# Patient Record
Sex: MALE | Race: OTHER | Employment: FULL TIME | URBAN - METROPOLITAN AREA
[De-identification: names, ages, dates, MRNs, and addresses within clinical notes are randomized per-mention and may not be internally consistent; named-entity substitution may affect disease eponyms.]

---

## 2021-06-08 ENCOUNTER — HOSPITAL ENCOUNTER (INPATIENT)
Age: 41
LOS: 1 days | Discharge: HOME | DRG: 812 | End: 2021-06-09
Attending: EMERGENCY MEDICINE | Admitting: INTERNAL MEDICINE

## 2021-06-08 ENCOUNTER — APPOINTMENT (OUTPATIENT)
Dept: CT IMAGING | Age: 41
DRG: 812 | End: 2021-06-08

## 2021-06-08 DIAGNOSIS — R10.9 FLANK PAIN: ICD-10-CM

## 2021-06-08 DIAGNOSIS — D50.0 IRON DEFICIENCY ANEMIA DUE TO CHRONIC BLOOD LOSS: ICD-10-CM

## 2021-06-08 DIAGNOSIS — D50.9 MICROCYTIC ANEMIA: Primary | ICD-10-CM

## 2021-06-08 PROBLEM — D64.9 SYMPTOMATIC ANEMIA: Status: ACTIVE | Noted: 2021-06-08

## 2021-06-08 LAB
A/G RATIO: 1.5 (ref 1.1–2.2)
ABO/RH: NORMAL
ALBUMIN SERPL-MCNC: 4.4 G/DL (ref 3.4–5)
ALP BLD-CCNC: 80 U/L (ref 40–129)
ALT SERPL-CCNC: 16 U/L (ref 10–40)
ANION GAP SERPL CALCULATED.3IONS-SCNC: 13 MMOL/L (ref 3–16)
ANTIBODY SCREEN: NORMAL
AST SERPL-CCNC: 26 U/L (ref 15–37)
BASOPHILS ABSOLUTE: 0.1 K/UL (ref 0–0.2)
BASOPHILS RELATIVE PERCENT: 2.4 %
BILIRUB SERPL-MCNC: 1 MG/DL (ref 0–1)
BILIRUBIN URINE: NEGATIVE
BLOOD, URINE: NEGATIVE
BUN BLDV-MCNC: 9 MG/DL (ref 7–20)
CALCIUM SERPL-MCNC: 8.5 MG/DL (ref 8.3–10.6)
CHLORIDE BLD-SCNC: 106 MMOL/L (ref 99–110)
CLARITY: CLEAR
CO2: 24 MMOL/L (ref 21–32)
COLOR: YELLOW
CREAT SERPL-MCNC: 0.8 MG/DL (ref 0.9–1.3)
EOSINOPHILS ABSOLUTE: 0.1 K/UL (ref 0–0.6)
EOSINOPHILS RELATIVE PERCENT: 1.9 %
EPITHELIAL CELLS, UA: 0 /HPF (ref 0–5)
ETHANOL: NORMAL MG/DL (ref 0–0.08)
FERRITIN: 3.3 NG/ML (ref 30–400)
FOLATE: 17.31 NG/ML (ref 4.78–24.2)
GFR AFRICAN AMERICAN: >60
GFR NON-AFRICAN AMERICAN: >60
GLOBULIN: 3 G/DL
GLUCOSE BLD-MCNC: 108 MG/DL (ref 70–99)
GLUCOSE URINE: NEGATIVE MG/DL
HCT VFR BLD CALC: 23.3 % (ref 40.5–52.5)
HCT VFR BLD CALC: 26.4 % (ref 40.5–52.5)
HCT VFR BLD CALC: 27.8 % (ref 40.5–52.5)
HEMATOLOGY PATH CONSULT: NORMAL
HEMOGLOBIN: 6.7 G/DL (ref 13.5–17.5)
HEMOGLOBIN: 7.7 G/DL (ref 13.5–17.5)
HEMOGLOBIN: 8 G/DL (ref 13.5–17.5)
HYALINE CASTS: 6 /LPF (ref 0–8)
INR BLD: 1.13 (ref 0.86–1.14)
IRON SATURATION: 3 % (ref 20–50)
IRON: 12 UG/DL (ref 59–158)
KETONES, URINE: NEGATIVE MG/DL
LEUKOCYTE ESTERASE, URINE: NEGATIVE
LIPASE: 24 U/L (ref 13–60)
LYMPHOCYTES ABSOLUTE: 1 K/UL (ref 1–5.1)
LYMPHOCYTES RELATIVE PERCENT: 18.8 %
MAGNESIUM: 2.3 MG/DL (ref 1.8–2.4)
MCH RBC QN AUTO: 16.7 PG (ref 26–34)
MCHC RBC AUTO-ENTMCNC: 28.8 G/DL (ref 31–36)
MCV RBC AUTO: 58.1 FL (ref 80–100)
MICROSCOPIC EXAMINATION: YES
MONOCYTES ABSOLUTE: 0.2 K/UL (ref 0–1.3)
MONOCYTES RELATIVE PERCENT: 4.2 %
NEUTROPHILS ABSOLUTE: 3.8 K/UL (ref 1.7–7.7)
NEUTROPHILS RELATIVE PERCENT: 72.7 %
NITRITE, URINE: NEGATIVE
OCCULT BLOOD DIAGNOSTIC: NORMAL
PDW BLD-RTO: 21.7 % (ref 12.4–15.4)
PH UA: 5.5 (ref 5–8)
PLATELET # BLD: 240 K/UL (ref 135–450)
PMV BLD AUTO: 9.8 FL (ref 5–10.5)
POTASSIUM SERPL-SCNC: 3.8 MMOL/L (ref 3.5–5.1)
PROTEIN UA: 30 MG/DL
PROTHROMBIN TIME: 13.1 SEC (ref 10–13.2)
RBC # BLD: 4.02 M/UL (ref 4.2–5.9)
RBC UA: 1 /HPF (ref 0–4)
SODIUM BLD-SCNC: 143 MMOL/L (ref 136–145)
SPECIFIC GRAVITY UA: >=1.03 (ref 1–1.03)
TOTAL IRON BINDING CAPACITY: 455 UG/DL (ref 260–445)
TOTAL PROTEIN: 7.4 G/DL (ref 6.4–8.2)
URINE REFLEX TO CULTURE: ABNORMAL
URINE TYPE: ABNORMAL
UROBILINOGEN, URINE: 0.2 E.U./DL
VITAMIN B-12: 621 PG/ML (ref 211–911)
WBC # BLD: 5.2 K/UL (ref 4–11)
WBC UA: 1 /HPF (ref 0–5)

## 2021-06-08 PROCEDURE — 83550 IRON BINDING TEST: CPT

## 2021-06-08 PROCEDURE — 86923 COMPATIBILITY TEST ELECTRIC: CPT

## 2021-06-08 PROCEDURE — 82607 VITAMIN B-12: CPT

## 2021-06-08 PROCEDURE — 6370000000 HC RX 637 (ALT 250 FOR IP): Performed by: PHYSICIAN ASSISTANT

## 2021-06-08 PROCEDURE — 80053 COMPREHEN METABOLIC PANEL: CPT

## 2021-06-08 PROCEDURE — 80307 DRUG TEST PRSMV CHEM ANLYZR: CPT

## 2021-06-08 PROCEDURE — G0328 FECAL BLOOD SCRN IMMUNOASSAY: HCPCS

## 2021-06-08 PROCEDURE — 85610 PROTHROMBIN TIME: CPT

## 2021-06-08 PROCEDURE — 96374 THER/PROPH/DIAG INJ IV PUSH: CPT

## 2021-06-08 PROCEDURE — 99285 EMERGENCY DEPT VISIT HI MDM: CPT

## 2021-06-08 PROCEDURE — 82728 ASSAY OF FERRITIN: CPT

## 2021-06-08 PROCEDURE — 6370000000 HC RX 637 (ALT 250 FOR IP): Performed by: INTERNAL MEDICINE

## 2021-06-08 PROCEDURE — 86850 RBC ANTIBODY SCREEN: CPT

## 2021-06-08 PROCEDURE — 85025 COMPLETE CBC W/AUTO DIFF WBC: CPT

## 2021-06-08 PROCEDURE — 83735 ASSAY OF MAGNESIUM: CPT

## 2021-06-08 PROCEDURE — 85014 HEMATOCRIT: CPT

## 2021-06-08 PROCEDURE — 83540 ASSAY OF IRON: CPT

## 2021-06-08 PROCEDURE — P9016 RBC LEUKOCYTES REDUCED: HCPCS

## 2021-06-08 PROCEDURE — 36430 TRANSFUSION BLD/BLD COMPNT: CPT

## 2021-06-08 PROCEDURE — 81001 URINALYSIS AUTO W/SCOPE: CPT

## 2021-06-08 PROCEDURE — 83690 ASSAY OF LIPASE: CPT

## 2021-06-08 PROCEDURE — 86901 BLOOD TYPING SEROLOGIC RH(D): CPT

## 2021-06-08 PROCEDURE — 74174 CTA ABD&PLVS W/CONTRAST: CPT

## 2021-06-08 PROCEDURE — 1200000000 HC SEMI PRIVATE

## 2021-06-08 PROCEDURE — 6360000004 HC RX CONTRAST MEDICATION: Performed by: EMERGENCY MEDICINE

## 2021-06-08 PROCEDURE — 2580000003 HC RX 258: Performed by: INTERNAL MEDICINE

## 2021-06-08 PROCEDURE — 36415 COLL VENOUS BLD VENIPUNCTURE: CPT

## 2021-06-08 PROCEDURE — 85018 HEMOGLOBIN: CPT

## 2021-06-08 PROCEDURE — 6360000002 HC RX W HCPCS

## 2021-06-08 PROCEDURE — 2000000000 HC ICU R&B

## 2021-06-08 PROCEDURE — 86900 BLOOD TYPING SEROLOGIC ABO: CPT

## 2021-06-08 PROCEDURE — 82746 ASSAY OF FOLIC ACID SERUM: CPT

## 2021-06-08 PROCEDURE — 6360000002 HC RX W HCPCS: Performed by: INTERNAL MEDICINE

## 2021-06-08 PROCEDURE — 82077 ASSAY SPEC XCP UR&BREATH IA: CPT

## 2021-06-08 RX ORDER — ACETAMINOPHEN 325 MG/1
650 TABLET ORAL EVERY 6 HOURS PRN
Status: DISCONTINUED | OUTPATIENT
Start: 2021-06-08 | End: 2021-06-09 | Stop reason: HOSPADM

## 2021-06-08 RX ORDER — KETOROLAC TROMETHAMINE 30 MG/ML
INJECTION, SOLUTION INTRAMUSCULAR; INTRAVENOUS
Status: COMPLETED
Start: 2021-06-08 | End: 2021-06-08

## 2021-06-08 RX ORDER — SODIUM CHLORIDE 9 MG/ML
INJECTION, SOLUTION INTRAVENOUS PRN
Status: DISCONTINUED | OUTPATIENT
Start: 2021-06-08 | End: 2021-06-09 | Stop reason: HOSPADM

## 2021-06-08 RX ORDER — ONDANSETRON 2 MG/ML
4 INJECTION INTRAMUSCULAR; INTRAVENOUS EVERY 6 HOURS PRN
Status: DISCONTINUED | OUTPATIENT
Start: 2021-06-08 | End: 2021-06-09 | Stop reason: HOSPADM

## 2021-06-08 RX ORDER — PEG-3350, SODIUM SULFATE, SODIUM CHLORIDE, POTASSIUM CHLORIDE, SODIUM ASCORBATE AND ASCORBIC ACID 7.5-2.691G
100 KIT ORAL ONCE
Status: DISCONTINUED | OUTPATIENT
Start: 2021-06-08 | End: 2021-06-08

## 2021-06-08 RX ORDER — ONDANSETRON 4 MG/1
4 TABLET, ORALLY DISINTEGRATING ORAL EVERY 8 HOURS PRN
Status: DISCONTINUED | OUTPATIENT
Start: 2021-06-08 | End: 2021-06-09 | Stop reason: HOSPADM

## 2021-06-08 RX ORDER — PANTOPRAZOLE SODIUM 40 MG/1
40 TABLET, DELAYED RELEASE ORAL
Status: DISCONTINUED | OUTPATIENT
Start: 2021-06-08 | End: 2021-06-09 | Stop reason: HOSPADM

## 2021-06-08 RX ORDER — SODIUM CHLORIDE 9 MG/ML
INJECTION, SOLUTION INTRAVENOUS CONTINUOUS
Status: DISCONTINUED | OUTPATIENT
Start: 2021-06-08 | End: 2021-06-09 | Stop reason: HOSPADM

## 2021-06-08 RX ORDER — KETOROLAC TROMETHAMINE 30 MG/ML
30 INJECTION, SOLUTION INTRAMUSCULAR; INTRAVENOUS ONCE
Status: COMPLETED | OUTPATIENT
Start: 2021-06-08 | End: 2021-06-08

## 2021-06-08 RX ORDER — SODIUM CHLORIDE 0.9 % (FLUSH) 0.9 %
5-40 SYRINGE (ML) INJECTION EVERY 12 HOURS SCHEDULED
Status: DISCONTINUED | OUTPATIENT
Start: 2021-06-08 | End: 2021-06-09 | Stop reason: HOSPADM

## 2021-06-08 RX ORDER — ACETAMINOPHEN 650 MG/1
650 SUPPOSITORY RECTAL EVERY 6 HOURS PRN
Status: DISCONTINUED | OUTPATIENT
Start: 2021-06-08 | End: 2021-06-09 | Stop reason: HOSPADM

## 2021-06-08 RX ORDER — SODIUM CHLORIDE 9 MG/ML
25 INJECTION, SOLUTION INTRAVENOUS PRN
Status: DISCONTINUED | OUTPATIENT
Start: 2021-06-08 | End: 2021-06-09 | Stop reason: HOSPADM

## 2021-06-08 RX ORDER — SODIUM CHLORIDE 0.9 % (FLUSH) 0.9 %
5-40 SYRINGE (ML) INJECTION PRN
Status: DISCONTINUED | OUTPATIENT
Start: 2021-06-08 | End: 2021-06-09 | Stop reason: HOSPADM

## 2021-06-08 RX ADMIN — SODIUM CHLORIDE: 9 INJECTION, SOLUTION INTRAVENOUS at 11:57

## 2021-06-08 RX ADMIN — PANTOPRAZOLE SODIUM 40 MG: 40 TABLET, DELAYED RELEASE ORAL at 14:05

## 2021-06-08 RX ADMIN — IRON SUCROSE 100 MG: 20 INJECTION, SOLUTION INTRAVENOUS at 16:28

## 2021-06-08 RX ADMIN — KETOROLAC TROMETHAMINE 30 MG: 30 INJECTION, SOLUTION INTRAMUSCULAR at 04:56

## 2021-06-08 RX ADMIN — Medication 10 ML: at 20:51

## 2021-06-08 RX ADMIN — KETOROLAC TROMETHAMINE 30 MG: 30 INJECTION, SOLUTION INTRAMUSCULAR; INTRAVENOUS at 04:56

## 2021-06-08 RX ADMIN — IOPAMIDOL 75 ML: 755 INJECTION, SOLUTION INTRAVENOUS at 05:46

## 2021-06-08 RX ADMIN — SODIUM CHLORIDE: 9 INJECTION, SOLUTION INTRAVENOUS at 23:09

## 2021-06-08 RX ADMIN — Medication 10 ML: at 11:58

## 2021-06-08 RX ADMIN — ACETAMINOPHEN 650 MG: 325 TABLET ORAL at 20:42

## 2021-06-08 ASSESSMENT — PAIN SCALES - GENERAL
PAINLEVEL_OUTOF10: 0
PAINLEVEL_OUTOF10: 10
PAINLEVEL_OUTOF10: 0
PAINLEVEL_OUTOF10: 8
PAINLEVEL_OUTOF10: 0
PAINLEVEL_OUTOF10: 0

## 2021-06-08 ASSESSMENT — PAIN DESCRIPTION - ONSET: ONSET: GRADUAL

## 2021-06-08 ASSESSMENT — ENCOUNTER SYMPTOMS
ABDOMINAL PAIN: 1
EYE PAIN: 0
SINUS PAIN: 0
SHORTNESS OF BREATH: 0
BACK PAIN: 1

## 2021-06-08 ASSESSMENT — PAIN DESCRIPTION - DIRECTION: RADIATING_TOWARDS: BUTTOCKS

## 2021-06-08 ASSESSMENT — PAIN DESCRIPTION - PAIN TYPE: TYPE: ACUTE PAIN

## 2021-06-08 ASSESSMENT — PAIN DESCRIPTION - ORIENTATION: ORIENTATION: LOWER

## 2021-06-08 ASSESSMENT — PAIN DESCRIPTION - FREQUENCY: FREQUENCY: CONTINUOUS

## 2021-06-08 ASSESSMENT — PAIN DESCRIPTION - DESCRIPTORS: DESCRIPTORS: ACHING

## 2021-06-08 ASSESSMENT — PAIN DESCRIPTION - LOCATION: LOCATION: BACK;BUTTOCKS

## 2021-06-08 NOTE — ED NOTES
Called lab to release blood, state there are two units ready however no transfusion release.       Gaston Moy RN  75/60/38 6921

## 2021-06-08 NOTE — CONSULTS
frequency   Integument/breast: negative for pruritus and rash   Hematologic/lymphatic: negative for bleeding and easy bruising   Musculoskeletal:negative for arthralgias and myalgias   Neurological: negative for dizziness and weakness           Physical Exam  Blood pressure 129/72, pulse 74, temperature 97.9 °F (36.6 °C), temperature source Temporal, resp. rate 16, height (!) 6.4\" (0.163 m), weight 150 lb (68 kg), SpO2 97 %. General appearance: alert, cooperative, no distress, appears stated age  Eyes: Anicteric  Head: Normocephalic, without obvious abnormality  Lungs: clear to auscultation bilaterally, Normal Effort  Heart: regular rate and rhythm, normal S1 and S2, no murmurs or rubs  Abdomen: soft, non-tender. Bowel sounds normal. No masses,  no organomegaly. Extremities: atraumatic, no cyanosis or edema  Skin: warm and dry, no jaundice  Neuro: Grossly intact, A&OX3  Musculoskeletal: 5/5  strength BUE      Data Review:    Recent Labs     06/08/21 0458   WBC 5.2   HGB 6.7*   HCT 23.3*   MCV 58.1*        Recent Labs     06/08/21 0458      K 3.8      CO2 24   BUN 9   CREATININE 0.8*     Recent Labs     06/08/21 0458   AST 26   ALT 16   BILITOT 1.0   ALKPHOS 80     Recent Labs     06/08/21 0458   LIPASE 24.0     No results for input(s): PROTIME, INR in the last 72 hours. No results for input(s): PTT in the last 72 hours. No results for input(s): OCCULTBLD in the last 72 hours.     Imaging Studies:                               CTA scan of chest abdomen and pelvis   Impression   No renal calculi, ureteral calculi, or findings to suggest a recently passed   stone to explain patient's flank pain.  Mild stool volume.  No acute   diverticulitis or appendicitis.       No acute cardiopulmonary process is identified.       There are 2 separate pulmonary nodule seen within the lung bases, the largest   measuring 4 mm.  Follow-up recommendations as below.       Mild prostate enlargement.     Assessment:     Active Problems:    Symptomatic anemia  Resolved Problems:    * No resolved hospital problems. *    Iron deficiency anemia - hgb 6.7 with MCV 58. Labs consider iron deficiency. No melena. Intermittent blood per rectum for a month along with constipation. Also takes NSAIDs so consider peptic ulcer disease. Some left lower quadrant pain. No source per CT. Recommendations:   -Iron replacement Per primary team  -Clear liquid diet today  -Bowel prep today  -N.p.o. midnight  -plan EGD and colonoscopy tomorrow  -Daily Protonix  -Avoid NSAIDs    Discussed with Dr. Sallie Berkowitz, PAR[facility]  [facility]    I have personally performed a face to face diagnostic evaluation on this patient. I have interviewed and examined the patient and I agree with the findings and recommended plan of care. In summary, my findings and plan are the following: anemia, abd soft, hbg low, I discussed with pt via video  rec for egd and colonosocpy and dangers of severe anemia, but he refuses egd and colonosocpy and ate solid food dinner; he states he wants to leave to get back to work as concerned may lose his job; I told him I respect his work ethic and offerred ask social work to see if can help and reitered rec eval anemia now to ensure safe to return work longer term. I asked nurse pass info to social work/hospitalist. Check mg and replete if low per primary team while starting ppi. Otherwise will sign off, call back if pt agreeable to evlauation.        Hannah Gitelman, MD  [address]

## 2021-06-08 NOTE — ED PROVIDER NOTES
[address] ENCOUNTER      Pt Name: Gely Roa  MRN: [id number]  Birthdate 1980  Date of evaluation: 6/8/2021  Provider: Renetta Issa MD    CHIEF COMPLAINT       Chief Complaint   Patient presents with    Flank Pain     pt brought in by ems. c/o LLQ abd pain. Greek speaking. HISTORY OF PRESENT ILLNESS   (Location/Symptom, Timing/Onset,Context/Setting, Quality, Duration, Modifying Factors, Severity)  Note limiting factors. Eran Morales is a 38 y.o. male who presents to the emergency department for left-sided flank pain. The patient states that that started about a week ago. He took some pain medication and had helped however it acutely worsened again last night. It is located towards the left lower back and does radiate to the front. No urinary symptoms. He denies any nausea or vomiting no chest pain no shortness of breath. He has been lightheaded and feel somewhat tired. He occasionally have blood in his stools denies any black stools. He does drink but not daily. Nursing notes were reviewed. REVIEW OF SYSTEMS    (2-9 systems for level 4, 10 or more for level 5)     Review of Systems   Constitutional: Positive for fatigue. HENT: Negative for ear pain and sinus pain. Eyes: Negative for pain. Respiratory: Negative for shortness of breath. Cardiovascular: Negative for chest pain. Gastrointestinal: Positive for abdominal pain. Genitourinary: Positive for flank pain. Negative for hematuria. Musculoskeletal: Positive for back pain. Skin: Negative for rash. Hematological: Does not bruise/bleed easily. PAST MEDICAL HISTORY   History reviewed. No pertinent past medical history. SURGICALHISTORY     History reviewed. No pertinent surgical history.       CURRENT MEDICATIONS       Previous Medications    No medications on file       ALLERGIES     Patient has no known and atraumatic. Right Ear: External ear normal.      Left Ear: External ear normal.      Nose: Nose normal.   Eyes:      General: No scleral icterus. Right eye: No discharge. Left eye: No discharge. Conjunctiva/sclera: Conjunctivae normal.   Cardiovascular:      Rate and Rhythm: Normal rate and regular rhythm. Heart sounds: Normal heart sounds. Pulmonary:      Effort: Pulmonary effort is normal. No respiratory distress. Breath sounds: Normal breath sounds. No wheezing or rales. Abdominal:      General: Bowel sounds are normal. There is no distension. Palpations: Abdomen is soft. Tenderness: There is no abdominal tenderness. There is no guarding or rebound. Genitourinary:     Comments: External hemorrhoid  Musculoskeletal:      Cervical back: Neck supple. Skin:     Coloration: Skin is not pale. Neurological:      Mental Status: He is alert. Psychiatric:         Mood and Affect: Mood normal.         Behavior: Behavior normal.             DIAGNOSTIC RESULTS     EKG: All EKG's are interpreted by the Emergency Department Physician who either signs or Co-signs this chart in the absence of a cardiologist.    12 lead EKG shows     RADIOLOGY:   Non-plain film images such as CT, Ultrasound and MRI are read by the radiologist. Plain radiographic images are visualized and preliminarily interpreted by the emergency physician with the below findings:        Interpretation per the Radiologist below, if available at the time of this note:    CTA CHEST ABDOMEN PELVIS W CONTRAST   Final Result   No renal calculi, ureteral calculi, or findings to suggest a recently passed   stone to explain patient's flank pain. Mild stool volume. No acute   diverticulitis or appendicitis. No acute cardiopulmonary process is identified. There are 2 separate pulmonary nodule seen within the lung bases, the largest   measuring 4 mm. Follow-up recommendations as below.       Mild prostate enlargement. RECOMMENDATIONS:   Multiple pulmonary nodules. Most severe: 4 mm left solid pulmonary nodule. No   routine follow-up imaging is recommended. These guidelines do not apply to immunocompromised patients and patients with   cancer. Follow up in patients with significant comorbidities as clinically   warranted. For lung cancer screening, adhere to Lung-RADS guidelines. Reference: Radiology. 2017; 284(1):228-43.                ED BEDSIDE ULTRASOUND:   Performed by ED Physician - none    LABS:  Labs Reviewed   CBC WITH AUTO DIFFERENTIAL - Abnormal; Notable for the following components:       Result Value    RBC 4.02 (*)     Hemoglobin 6.7 (*)     Hematocrit 23.3 (*)     MCV 58.1 (*)     MCH 16.7 (*)     MCHC 28.8 (*)     RDW 21.7 (*)     All other components within normal limits    Narrative:     Dez Russo  SFERF tel. [phone],  Hematology results called to and read back by GRICEL Garcia, 06/08/2021  05:17, by Mario Restrepo  Performed at:  [facility] [address], [place]   Phone [phone]   COMPREHENSIVE METABOLIC PANEL - Abnormal; Notable for the following components:    Glucose 108 (*)     CREATININE 0.8 (*)     All other components within normal limits    Narrative:     Performed at:  [facility] [address], [place]   Phone [phone]   URINE RT REFLEX TO CULTURE - Abnormal; Notable for the following components:    Protein, UA 30 (*)     All other components within normal limits    Narrative:     Performed at:  [facility] [address], [place]   Phone [phone]   LIPASE    Narrative:     Performed at:  [facility] [address], [place]   Phone [phone]   MICROSCOPIC URINALYSIS    Narrative:     Performed at:  [facility]  [address] [facility]

## 2021-06-08 NOTE — PROGRESS NOTES
Pt admitted in room 3913. VSS. IVF running as ordered. Pt non english speaking. Used  to complete admission, head to toe assessments. Will continue to monitor.

## 2021-06-08 NOTE — H&P
HOSPITALISTS HISTORY AND PHYSICAL    6/8/2021 8:51 AM    Patient Information:  ERAN Torres is a 38 y.o. male [id number]  PCP:  No primary care provider on file. (Tel: None )    Chief complaint:    Chief Complaint   Patient presents with    Flank Pain     pt brought in by ems. c/o LLQ abd pain. Syriac speaking. History of Present Illness:  Eran Galvan is a 38 y.o.  male with no significant past medical history who presented to ED with complaints of left lower quadrant abdominal pain. Also reports intermittent bloody bowel movements for at least a month. Pain abdomen worse with movement and food intake. Patient reports that he drank 12 cans of beer and snorted cocaine last night. Patient denies history of alcohol abuse or recreational drug use prior to yesterday. Reports sweating and lightheadedness. In ED, patient is hemodynamically stable. Lab work-up was notable for hemoglobin level of 6.7. Stool positive for occult blood. Receiving a unit of PRBC transfusion. Being admitted with GI service consultation. History obtained from patient and ED provider. REVIEW OF SYSTEMS:   Constitutional: Negative for fever,chills or night sweats  ENT: Negative for rhinorrhea, epistaxis, hoarseness, sore throat. Respiratory: Negative for shortness of breath,wheezing  Cardiovascular: Negative for chest pain, palpitations   Gastrointestinal: Negative for nausea, vomiting, diarrhea.  Positive for LLQ pain, bloody bowel movements for a month  Genitourinary: Negative for polyuria, dysuria   Hematologic/Lymphatic: Negative for bleeding tendency, easy bruising  Musculoskeletal: Negative for myalgias and arthralgias  Neurologic: Negative for confusion,dysarthria  Skin: Negative for itching,rash  Psychiatric: Negative for depression,anxiety, agitation  Endocrine: Negative for polydipsia,polyuria,heat /cold intolerance    Past Medical History:   has no past medical history on file. Past Surgical History:   has no past surgical history on file. Medications:  No current facility-administered medications on file prior to encounter. No current outpatient medications on file prior to encounter. Allergies:  No Known Allergies     Social History:  Patient Lives at home. reports that he has been smoking. He has been smoking about 1.00 pack per day. He has never used smokeless tobacco.     Family History:  family history is not on file. Physical Exam:  BP (!) 143/93   Pulse (P) 78   Temp (P) 99.2 °F (37.3 °C) (Oral)   Resp (P) 18   SpO2 97%     General appearance:  Appears comfortable. Well nourished  Eyes: Sclera clear, pupils equal  ENT: Moist mucus membranes, no thrush. Trachea midline. Cardiovascular: Regular rhythm, normal S1, S2. No murmur, gallop, rub. No edema in lower extremities  Respiratory: Clear to auscultation bilaterally, no wheeze, good inspiratory effort  Gastrointestinal: Abdomen soft, non-tender, not distended, normal bowel sounds  Musculoskeletal: No cyanosis in digits, neck supple  Neurology: Cranial nerves grossly intact. Alert and oriented in time, place and person. No speech or motor deficits  Psychiatry: Appropriate affect.  Not agitated  Skin: Warm, dry, normal turgor, no rash  Brisk capillary refill, peripheral pulses palpable     Labs:  CBC:   Lab Results   Component Value Date    WBC 5.2 06/08/2021    RBC 4.02 06/08/2021    HGB 6.7 06/08/2021    HCT 23.3 06/08/2021    MCV 58.1 06/08/2021    MCH 16.7 06/08/2021    MCHC 28.8 06/08/2021    RDW 21.7 06/08/2021     06/08/2021    MPV 9.8 06/08/2021     BMP:    Lab Results   Component Value Date     06/08/2021    K 3.8 06/08/2021     06/08/2021    CO2 24 06/08/2021    BUN 9 06/08/2021    CREATININE 0.8 06/08/2021    CALCIUM 8.5 06/08/2021    GFRAA >60 06/08/2021    LABGLOM >60 06/08/2021    GLUCOSE 108 06/08/2021     CTA CHEST ABDOMEN PELVIS W CONTRAST   Final Result   No renal calculi, ureteral calculi, or findings to suggest a recently passed   stone to explain patient's flank pain. Mild stool volume. No acute   diverticulitis or appendicitis. No acute cardiopulmonary process is identified. There are 2 separate pulmonary nodule seen within the lung bases, the largest   measuring 4 mm. Follow-up recommendations as below. Mild prostate enlargement. RECOMMENDATIONS:   Multiple pulmonary nodules. Most severe: 4 mm left solid pulmonary nodule. No   routine follow-up imaging is recommended. These guidelines do not apply to immunocompromised patients and patients with   cancer. Follow up in patients with significant comorbidities as clinically   warranted. For lung cancer screening, adhere to Lung-RADS guidelines. Reference: Radiology. 2017; 284(1):228-43. Discussed case  with patient and ED provider. Problem List  Active Problems:    * No active hospital problems. *  Resolved Problems:    * No resolved hospital problems. *        Assessment/Plan:     Symptomatic anemia:  Hemoglobin level of 6.7 associated with fatigue and lightheadedness. Patient received a unit of PRBC transfusion. Posttransfusion hemoglobin level of 8.0.  GI service consulted. H/H every 6 hours. Protonix p.o. daily. Avoid NSAIDs. Multiple pulmonary nodules:  No routine follow-up imaging recommended by radiology. DVT prophylaxis SCDs  Code status full code  Diet n.p.o.  IV access peripheral IV  Benitez Catheter no    Admit as inpatient. I anticipate hospitalization spanning less than two midnights for investigation and treatment of the above medically necessary diagnoses. Please note that some part of this chart was generated using Dragon dictation software.  Although every effort was made to ensure the accuracy of this automated transcription, some errors in transcription may have occurred inadvertently. If you may need any clarification, please do not hesitate to contact me through [facility].        Roxanna Serra MD    6/8/2021 8:51 AM

## 2021-06-08 NOTE — ED NOTES
Bedside handoff to [facility], nurse resuming care of patient. Pt transported via wheelchair on telemetry monitor with belongings in tow.  Blood finished infusing at this time      Omi Chawla RN  32/28/60 2380

## 2021-06-08 NOTE — ED NOTES
Pt monitored per transfusion protocol. Pt tolerating transfusion at this time. Cycling on monitor. VSS, call light within reach.       Joslyn Butler RN  76/73/48 8964

## 2021-06-09 VITALS
SYSTOLIC BLOOD PRESSURE: 140 MMHG | TEMPERATURE: 97.4 F | WEIGHT: 150.35 LBS | HEIGHT: 60 IN | OXYGEN SATURATION: 99 % | DIASTOLIC BLOOD PRESSURE: 89 MMHG | BODY MASS INDEX: 29.52 KG/M2 | HEART RATE: 57 BPM | RESPIRATION RATE: 18 BRPM

## 2021-06-09 PROBLEM — D64.9 SYMPTOMATIC ANEMIA: Status: RESOLVED | Noted: 2021-06-08 | Resolved: 2021-06-09

## 2021-06-09 LAB
ALBUMIN SERPL-MCNC: 4 G/DL (ref 3.4–5)
ALP BLD-CCNC: 79 U/L (ref 40–129)
ALT SERPL-CCNC: 14 U/L (ref 10–40)
AMPHETAMINE SCREEN, URINE: ABNORMAL
ANION GAP SERPL CALCULATED.3IONS-SCNC: 9 MMOL/L (ref 3–16)
AST SERPL-CCNC: 25 U/L (ref 15–37)
BARBITURATE SCREEN URINE: ABNORMAL
BENZODIAZEPINE SCREEN, URINE: ABNORMAL
BILIRUB SERPL-MCNC: 1.8 MG/DL (ref 0–1)
BILIRUBIN DIRECT: 0.3 MG/DL (ref 0–0.3)
BILIRUBIN, INDIRECT: 1.5 MG/DL (ref 0–1)
BUN BLDV-MCNC: 8 MG/DL (ref 7–20)
CALCIUM SERPL-MCNC: 8.6 MG/DL (ref 8.3–10.6)
CANNABINOID SCREEN URINE: ABNORMAL
CHLORIDE BLD-SCNC: 107 MMOL/L (ref 99–110)
CO2: 24 MMOL/L (ref 21–32)
COCAINE METABOLITE SCREEN URINE: POSITIVE
CREAT SERPL-MCNC: 0.7 MG/DL (ref 0.9–1.3)
GFR AFRICAN AMERICAN: >60
GFR NON-AFRICAN AMERICAN: >60
GLUCOSE BLD-MCNC: 97 MG/DL (ref 70–99)
HCT VFR BLD CALC: 25.2 % (ref 40.5–52.5)
HCT VFR BLD CALC: 27.1 % (ref 40.5–52.5)
HEMOGLOBIN: 7.2 G/DL (ref 13.5–17.5)
HEMOGLOBIN: 7.8 G/DL (ref 13.5–17.5)
LACTIC ACID: 0.8 MMOL/L (ref 0.4–2)
Lab: ABNORMAL
METHADONE SCREEN, URINE: ABNORMAL
OPIATE SCREEN URINE: ABNORMAL
OXYCODONE URINE: ABNORMAL
PH UA: 5
PHENCYCLIDINE SCREEN URINE: ABNORMAL
POTASSIUM REFLEX MAGNESIUM: 3.8 MMOL/L (ref 3.5–5.1)
PROPOXYPHENE SCREEN: ABNORMAL
SODIUM BLD-SCNC: 140 MMOL/L (ref 136–145)
TOTAL PROTEIN: 6.9 G/DL (ref 6.4–8.2)

## 2021-06-09 PROCEDURE — 85014 HEMATOCRIT: CPT

## 2021-06-09 PROCEDURE — 6370000000 HC RX 637 (ALT 250 FOR IP): Performed by: PHYSICIAN ASSISTANT

## 2021-06-09 PROCEDURE — 2580000003 HC RX 258: Performed by: INTERNAL MEDICINE

## 2021-06-09 PROCEDURE — 85018 HEMOGLOBIN: CPT

## 2021-06-09 PROCEDURE — 80048 BASIC METABOLIC PNL TOTAL CA: CPT

## 2021-06-09 PROCEDURE — 36415 COLL VENOUS BLD VENIPUNCTURE: CPT

## 2021-06-09 PROCEDURE — 80076 HEPATIC FUNCTION PANEL: CPT

## 2021-06-09 PROCEDURE — 83605 ASSAY OF LACTIC ACID: CPT

## 2021-06-09 PROCEDURE — 6370000000 HC RX 637 (ALT 250 FOR IP): Performed by: INTERNAL MEDICINE

## 2021-06-09 RX ORDER — LANOLIN ALCOHOL/MO/W.PET/CERES
325 CREAM (GRAM) TOPICAL
Qty: 90 TABLET | Refills: 1 | Status: SHIPPED | OUTPATIENT
Start: 2021-06-09

## 2021-06-09 RX ORDER — PANTOPRAZOLE SODIUM 40 MG/1
40 TABLET, DELAYED RELEASE ORAL
Qty: 30 TABLET | Refills: 0 | Status: SHIPPED | OUTPATIENT
Start: 2021-06-10

## 2021-06-09 RX ADMIN — ACETAMINOPHEN 650 MG: 325 TABLET ORAL at 01:32

## 2021-06-09 RX ADMIN — SODIUM CHLORIDE: 9 INJECTION, SOLUTION INTRAVENOUS at 07:04

## 2021-06-09 RX ADMIN — PANTOPRAZOLE SODIUM 40 MG: 40 TABLET, DELAYED RELEASE ORAL at 05:27

## 2021-06-09 ASSESSMENT — PAIN SCALES - GENERAL
PAINLEVEL_OUTOF10: 6
PAINLEVEL_OUTOF10: 0
PAINLEVEL_OUTOF10: 0

## 2021-06-09 NOTE — DISCHARGE INSTR - COC
Continuity of Care Form    Patient Name: Arlet Goodman   :  1980  MRN:  [id number]    Admit date:  2021  Discharge date:  ***    Code Status Order: Full Code   Advance Directives:   Advance Care Flowsheet Documentation     Date/Time Healthcare Directive Type of Healthcare Directive Copy in [address] Agent's Name Healthcare Agent's Phone Number    21 1126  Unknown, patient unable to respond due to medical condition -- -- -- -- --          Admitting Physician:  Fabby Leiva MD  PCP: No primary care provider on file. Discharging Nurse: [facility] Unit/Room#: SYC-8952/0359-39  Discharging Unit Phone Number: ***    Emergency Contact:   Extended Emergency Contact Information  Primary Emergency Contact: [address] Phone: [phone]  Relation: Other    Past Surgical History:  History reviewed. No pertinent surgical history. Immunization History: There is no immunization history on file for this patient.     Active Problems:  Patient Active Problem List   Diagnosis Code   (none) - all problems resolved or deleted       Isolation/Infection:   Isolation          No Isolation        Patient Infection Status     None to display          Nurse Assessment:  Last Vital Signs: BP (!) 140/89   Pulse 57   Temp 97.4 °F (36.3 °C) (Temporal)   Resp 18   Ht (!) 6.4\" (0.163 m)   Wt 150 lb 5.7 oz (68.2 kg)   SpO2 99%   BMI 2580.82 kg/m²     Last documented pain score (0-10 scale): Pain Level: 0  Last Weight:   Wt Readings from Last 1 Encounters:   21 150 lb 5.7 oz (68.2 kg)     Mental Status:  {IP PT MENTAL STATUS:}    IV Access:  { GLENN IV ACCESS:209767056}    Nursing Mobility/ADLs:  Walking   {CHP DME GWGZ:314771753}  Transfer  {CHP DME KVBS:680712582}  Bathing  {CHP DME CNPR:446512330}  Dressing  {CHP DME VVCA:355762196}  Toileting  {CHP DME IHGK:950826445}  Feeding  {CHP DME OKXL:188025738}  Med Admin  {CHP DME WMPX:629399419}  Med Delivery   [address] GLENN MED Delivery:405386540}    Wound Care Documentation and Therapy:        Elimination:  Continence:   · Bowel: {YES / RY:93138}  · Bladder: {YES / RW:51469}  Urinary Catheter: {Urinary Catheter:044289359}   Colostomy/Ileostomy/Ileal Conduit: {YES / VQ:43840}       Date of Last BM: ***    Intake/Output Summary (Last 24 hours) at 2021 0924  Last data filed at 2021 0264  Gross per 24 hour   Intake 2433.37 ml   Output --   Net 2433.37 ml     I/O last 3 completed shifts:   In: 350 [Blood:350]  Out: -     Safety Concerns:     [address] Safety Concerns:898775186}    Impairments/Disabilities:      [address] Impairments/Disabilities:755786869}    Nutrition Therapy:  Current Nutrition Therapy:   [address] Diet List:797280937}    Routes of Feeding: {P DME Other Feedings:180173540}  Liquids: {Slp liquid thickness:58477}  Daily Fluid Restriction: {CHP DME Yes amt example:267875640}  Last Modified Barium Swallow with Video (Video Swallowing Test): {Done Not Done PSIT:411582984}    Treatments at the Time of Hospital Discharge:   Respiratory Treatments: ***  Oxygen Therapy:  {Therapy; copd oxygen:46016}  Ventilator:    { CC Vent OOGZ:165314993}    Rehab Therapies: {THERAPEUTIC INTERVENTION:3763781019}  Weight Bearing Status/Restrictions: [address]  Weight Bearin}  Other Medical Equipment (for information only, NOT a DME order):  {EQUIPMENT:648688225}  Other Treatments: ***    Patient's personal belongings (please select all that are sent with patient):  {[facility] DME Belongings:514351569}    RN SIGNATURE:  {Esignature:823409956}    CASE MANAGEMENT/SOCIAL WORK SECTION    Inpatient Status Date: ***    Readmission Risk Assessment Score:  Readmission Risk              Risk of Unplanned Readmission:  8           Discharging to Facility/ Agency   · Name:   · Address:  · Phone:  · Fax:    Dialysis Facility (if applicable)   · Name:  · Address:  · Dialysis Schedule:  · Phone:  · Fax:    Case Manager/ signature: {Esignature:273905350}    PHYSICIAN SECTION    Prognosis: {Prognosis:4190292690}    Condition at Discharge: [address] Patient Condition:351346873}    Rehab Potential (if transferring to Rehab): {Prognosis:0943562634}    Recommended Labs or Other Treatments After Discharge: ***    Physician Certification: I certify the above information and transfer of [address]aitanya Cross  is necessary for the continuing treatment of the diagnosis listed and that he requires {Admit to Appropriate Level of Care:27852} for {GREATER/LESS:044863003} 30 days.      Update Admission H&P: {CHP DME Changes in RYDEU:987926768}    PHYSICIAN SIGNATURE:  {Esignature:658701710}

## 2021-06-09 NOTE — PROGRESS NOTES
Assisting primary RN with discharge to home. Peripheral IV removed. All belongings packed and given to patient.  service used for discharge instructions and all questions answered. Iron and Protonix prescriptions sent to [facility] pharmacy per request and set up with PCP list. Work release given to patient. Pt discharged to home with no needs. Pako Rodgers RN, BSN, CCRN.

## 2021-06-09 NOTE — CARE COORDINATION
PCP follow up:    Gladis Hankins 9:30AM [address] [address] . .. If he needs to cancel or reschedule he will need to call [phone] he can then choose [place] at that time and someone will speak to him in [place]      Darling Castellon MSW, [address]

## 2021-06-09 NOTE — PROGRESS NOTES
Patient Assessed, see flow sheet, he is alert and oriented x4, came in w/o c/o abd pain. VSS bed in low position alarm on call light in reach .

## 2021-06-09 NOTE — DISCHARGE SUMMARY
[facility]    Patient Demographics    Patient. Eran Gutierrez  Date of Birth. 1980  MRN. [id number]     Primary care provider. No primary care provider on file. (Tel: None)    Admit date: 6/8/2021    Discharge date (blank if same as Note Date): Note Date: 6/9/2021     Reason for Hospitalization. Chief Complaint   Patient presents with    Flank Pain     pt brought in by ems. c/o LLQ abd pain. Irish speaking. Significant Findings. Active Problems:    * No active hospital problems. *  Resolved Problems:    Symptomatic anemia       Problems and results from this hospitalization that need follow up. 1. GI bleed  2. Anemia    Significant test results and incidental findings. CTA CHEST ABDOMEN PELVIS W CONTRAST   Final Result   No renal calculi, ureteral calculi, or findings to suggest a recently passed   stone to explain patient's flank pain. Mild stool volume. No acute   diverticulitis or appendicitis. No acute cardiopulmonary process is identified. There are 2 separate pulmonary nodule seen within the lung bases, the largest   measuring 4 mm. Follow-up recommendations as below. Mild prostate enlargement. RECOMMENDATIONS:   Multiple pulmonary nodules. Most severe: 4 mm left solid pulmonary nodule. No   routine follow-up imaging is recommended. These guidelines do not apply to immunocompromised patients and patients with   cancer. Follow up in patients with significant comorbidities as clinically   warranted. For lung cancer screening, adhere to Lung-RADS guidelines. Reference: Radiology. 2017; 284(1):228-43. Invasive procedures and treatments. 1. None     Problem-based Hospital Course. Eran Morales is a 38 y.o.  male whopresented to ED with complaints of left lower quadrant abdominal pain. Admits alcohol and cocaine intkae the night before presentation. FOBT positive.  Hb level of EC tablet  · pantoprazole 40 MG tablet         Discharge recommendations given to patient. Follow Up. PCP in 1 week   Disposition. home  Activity. activity as tolerated  Diet: ADULT DIET; Regular      Spent 50 minutes in discharge process.     Signed:  Jah Casas MD     6/9/2021 9:00 AM

## 2021-06-09 NOTE — PROGRESS NOTES
Patient c/o back pain in lower left quadrant administered tylenol and repositioned will reassess pain

## 2021-06-12 LAB
BLOOD BANK DISPENSE STATUS: NORMAL
BLOOD BANK DISPENSE STATUS: NORMAL
BLOOD BANK PRODUCT CODE: NORMAL
BLOOD BANK PRODUCT CODE: NORMAL
BPU ID: NORMAL
BPU ID: NORMAL
DESCRIPTION BLOOD BANK: NORMAL
DESCRIPTION BLOOD BANK: NORMAL